# Patient Record
Sex: MALE | Race: WHITE | NOT HISPANIC OR LATINO | URBAN - METROPOLITAN AREA
[De-identification: names, ages, dates, MRNs, and addresses within clinical notes are randomized per-mention and may not be internally consistent; named-entity substitution may affect disease eponyms.]

---

## 2023-01-01 ENCOUNTER — TELEPHONE (OUTPATIENT)
Dept: ONCOLOGY | Facility: CLINIC | Age: 68
End: 2023-01-01
Payer: COMMERCIAL

## 2023-01-01 ENCOUNTER — TRANSCRIBE ORDERS (OUTPATIENT)
Dept: OTHER | Age: 68
End: 2023-01-01

## 2023-01-01 ENCOUNTER — TELEPHONE (OUTPATIENT)
Dept: TRANSPLANT | Facility: CLINIC | Age: 68
End: 2023-01-01
Payer: COMMERCIAL

## 2023-01-01 ENCOUNTER — ALLIED HEALTH/NURSE VISIT (OUTPATIENT)
Dept: TRANSPLANT | Facility: CLINIC | Age: 68
End: 2023-01-01
Payer: COMMERCIAL

## 2023-01-01 ENCOUNTER — OFFICE VISIT (OUTPATIENT)
Dept: ONCOLOGY | Facility: CLINIC | Age: 68
End: 2023-01-01
Attending: STUDENT IN AN ORGANIZED HEALTH CARE EDUCATION/TRAINING PROGRAM
Payer: COMMERCIAL

## 2023-01-01 ENCOUNTER — PATIENT OUTREACH (OUTPATIENT)
Dept: SURGERY | Facility: CLINIC | Age: 68
End: 2023-01-01
Payer: COMMERCIAL

## 2023-01-01 ENCOUNTER — PATIENT OUTREACH (OUTPATIENT)
Dept: CARE COORDINATION | Facility: CLINIC | Age: 68
End: 2023-01-01
Payer: COMMERCIAL

## 2023-01-01 ENCOUNTER — VIRTUAL VISIT (OUTPATIENT)
Dept: ONCOLOGY | Facility: CLINIC | Age: 68
End: 2023-01-01
Attending: NURSE PRACTITIONER
Payer: COMMERCIAL

## 2023-01-01 ENCOUNTER — PATIENT OUTREACH (OUTPATIENT)
Dept: ONCOLOGY | Facility: CLINIC | Age: 68
End: 2023-01-01
Payer: COMMERCIAL

## 2023-01-01 ENCOUNTER — PRE VISIT (OUTPATIENT)
Dept: OTHER | Age: 68
End: 2023-01-01

## 2023-01-01 VITALS
TEMPERATURE: 98.1 F | HEIGHT: 70 IN | SYSTOLIC BLOOD PRESSURE: 167 MMHG | WEIGHT: 203.5 LBS | RESPIRATION RATE: 12 BRPM | DIASTOLIC BLOOD PRESSURE: 83 MMHG | BODY MASS INDEX: 29.13 KG/M2 | OXYGEN SATURATION: 100 % | HEART RATE: 65 BPM

## 2023-01-01 VITALS
RESPIRATION RATE: 16 BRPM | TEMPERATURE: 98.1 F | DIASTOLIC BLOOD PRESSURE: 74 MMHG | WEIGHT: 183.3 LBS | BODY MASS INDEX: 26.27 KG/M2 | SYSTOLIC BLOOD PRESSURE: 120 MMHG | HEART RATE: 77 BPM | OXYGEN SATURATION: 99 %

## 2023-01-01 VITALS — HEIGHT: 71 IN | WEIGHT: 185 LBS | BODY MASS INDEX: 25.9 KG/M2

## 2023-01-01 VITALS — BODY MASS INDEX: 24.92 KG/M2 | HEIGHT: 71 IN | WEIGHT: 178 LBS

## 2023-01-01 DIAGNOSIS — C25.2: ICD-10-CM

## 2023-01-01 DIAGNOSIS — C25.2: Primary | ICD-10-CM

## 2023-01-01 DIAGNOSIS — Z00.6 RESEARCH STUDY PATIENT: Primary | ICD-10-CM

## 2023-01-01 DIAGNOSIS — C25.9 PANCREATIC CANCER (H): Primary | ICD-10-CM

## 2023-01-01 DIAGNOSIS — Z00.6 EXAMINATION OF PARTICIPANT OR CONTROL IN CLINICAL RESEARCH: ICD-10-CM

## 2023-01-01 DIAGNOSIS — C25.1 MALIGNANT NEOPLASM OF BODY OF PANCREAS (H): Primary | ICD-10-CM

## 2023-01-01 LAB
BMT WORKUP IRRADIATED BLOOD REQUIRED: NORMAL
SPECIMEN EXPIRATION DATE: NORMAL

## 2023-01-01 PROCEDURE — 99215 OFFICE O/P EST HI 40 MIN: CPT | Performed by: STUDENT IN AN ORGANIZED HEALTH CARE EDUCATION/TRAINING PROGRAM

## 2023-01-01 PROCEDURE — G0463 HOSPITAL OUTPT CLINIC VISIT: HCPCS | Performed by: STUDENT IN AN ORGANIZED HEALTH CARE EDUCATION/TRAINING PROGRAM

## 2023-01-01 PROCEDURE — 99417 PROLNG OP E/M EACH 15 MIN: CPT | Performed by: NURSE PRACTITIONER

## 2023-01-01 PROCEDURE — 99215 OFFICE O/P EST HI 40 MIN: CPT | Performed by: NURSE PRACTITIONER

## 2023-01-01 PROCEDURE — 99205 OFFICE O/P NEW HI 60 MIN: CPT | Performed by: STUDENT IN AN ORGANIZED HEALTH CARE EDUCATION/TRAINING PROGRAM

## 2023-01-01 RX ORDER — LOSARTAN POTASSIUM 25 MG/1
1 TABLET ORAL EVERY MORNING
COMMUNITY
Start: 2023-01-01 | End: 2023-01-01

## 2023-01-01 RX ORDER — ATORVASTATIN CALCIUM 40 MG/1
40 TABLET, FILM COATED ORAL
COMMUNITY
Start: 2022-10-10

## 2023-01-01 RX ORDER — LOPERAMIDE HCL 2 MG
CAPSULE ORAL
COMMUNITY
Start: 2023-01-01

## 2023-01-01 RX ORDER — ACETAMINOPHEN 500 MG
1000 TABLET ORAL
COMMUNITY
Start: 2023-01-01

## 2023-01-01 RX ORDER — ONDANSETRON 8 MG/1
8 TABLET, FILM COATED ORAL EVERY 8 HOURS PRN
COMMUNITY
Start: 2023-01-01

## 2023-01-01 RX ORDER — METOPROLOL SUCCINATE 25 MG/1
12.5 TABLET, EXTENDED RELEASE ORAL 2 TIMES DAILY
COMMUNITY

## 2023-01-01 RX ORDER — MAGNESIUM HYDROXIDE/ALUMINUM HYDROXICE/SIMETHICONE 120; 1200; 1200 MG/30ML; MG/30ML; MG/30ML
30 SUSPENSION ORAL
COMMUNITY
Start: 2023-01-01

## 2023-01-01 RX ORDER — LINEZOLID 600 MG/1
TABLET, FILM COATED ORAL
COMMUNITY
Start: 2023-01-01 | End: 2023-01-01

## 2023-01-01 RX ORDER — MORPHINE SULFATE 15 MG/1
TABLET ORAL
COMMUNITY
Start: 2023-01-01

## 2023-01-01 RX ORDER — LORAZEPAM 0.5 MG/1
0.5 TABLET ORAL EVERY 8 HOURS PRN
COMMUNITY
Start: 2023-01-01

## 2023-01-01 RX ORDER — AMLODIPINE BESYLATE 5 MG/1
1 TABLET ORAL EVERY MORNING
COMMUNITY
Start: 2023-01-01 | End: 2023-01-01

## 2023-01-01 RX ORDER — TAMSULOSIN HYDROCHLORIDE 0.4 MG/1
0.4 CAPSULE ORAL
COMMUNITY
Start: 2022-10-10

## 2023-01-01 RX ORDER — BLOOD-GLUCOSE METER
EACH MISCELLANEOUS
COMMUNITY
Start: 2023-01-01

## 2023-01-01 RX ORDER — OMEPRAZOLE 40 MG/1
40 CAPSULE, DELAYED RELEASE ORAL
COMMUNITY
Start: 2023-01-01

## 2023-01-01 RX ORDER — PROCHLORPERAZINE MALEATE 10 MG
10 TABLET ORAL EVERY 6 HOURS PRN
COMMUNITY
Start: 2023-01-01

## 2023-01-01 RX ORDER — PANCRELIPASE 60000; 12000; 38000 [USP'U]/1; [USP'U]/1; [USP'U]/1
CAPSULE, DELAYED RELEASE PELLETS ORAL
COMMUNITY
Start: 2023-01-01

## 2023-01-01 RX ORDER — AMOXICILLIN 250 MG
1 CAPSULE ORAL
COMMUNITY
Start: 2021-10-10

## 2023-01-01 RX ORDER — DEXAMETHASONE 2 MG/1
2 TABLET ORAL
COMMUNITY
Start: 2023-01-01 | End: 2024-05-07

## 2023-01-01 ASSESSMENT — PAIN SCALES - GENERAL
PAINLEVEL: SEVERE PAIN (6)
PAINLEVEL: MODERATE PAIN (4)
PAINLEVEL: NO PAIN (0)

## 2023-03-29 NOTE — TELEPHONE ENCOUNTER
Action Taken REQUEST SENT TO [facility] FOR ALL IMAGING DATING BACK TO 2018  REQUEST SENT TO [facility] FOR EUS ERCP AND PATH REPORTS.  CK

## 2023-03-29 NOTE — TELEPHONE ENCOUNTER
Action 03/29/2023 503PM   Action Taken IMAGING RESOLVED PENDING EUS AND ERCP AND PATH REPORTS.    CK

## 2023-03-30 NOTE — PROGRESS NOTES
New Patient Oncology Nurse Navigator Note     Referring provider: Dr. Engle     Referring Clinic/Organization: [facility]      Referred to: Surgical Oncology -  Hepatobiliary / GI Cancers     Requested provider (if applicable): First available provider    Referral Received: 03/30/23       Evaluation for : Pancreas cancer      Clinical History (per Nurse review of records provided):      See book marked documents:       Referring MD office note    Pathology report    Imaging reports    Clinical Assessment / Barriers to Care (Per Nurse):    None at this time.     Records Location:     [facility] Everywhere     Records Needed:     ABDOMINAL IMAGING (CT SCANS, MRI, US, PET SCANS)  DATING BACK TO 2018      Additional testing needed prior to consult:     NONE AT THIS TIME    Referral updates and Plan:       Consult with Surgical Oncology       Lillian Petty RN, BSN   Surgical Oncology New Patient Nurse Navigator  [facility]  [phone]

## 2023-03-31 NOTE — TELEPHONE ENCOUNTER
Imaging Received  2023 10:34 AM ABT   Action: Images from [facility] received and resolved to PACS.     Action 2023 9:28 AM ABT   Action Taken Called and spoke to Ramonita with [facility], she states that images were done with [facility]    Called Judith Rosenthal and unable to speak to team. LVM for amanada with urgent abd/pel image request     RECORDS STATUS - ALL OTHER DIAGNOSIS      RECORDS RECEIVED FROM: [facility]   DATE RECEIVED:    NOTES STATUS DETAILS   OFFICE NOTE from referring provider -[facility] 23: Dr Kavitha Engle   OFFICE NOTE from medical oncologist EILEEN-[facility] 23: Dr Kavitha Engle   [facility]   MEDICATION LIST [facility]    LABS     PATHOLOGY REPORTS Req -[facility]  [employer] 23: [id number]  23: [id number]   ANYTHING RELATED TO DIAGNOSIS [facility] Most recent 23   IMAGING (NEED IMAGES & REPORT)     CT SCANS PACS [facility]:   23--16: CT Abd Pel  23: CT Abd

## 2023-04-21 NOTE — NURSING NOTE
"Oncology Rooming Note    April 21, 2023 11:26 AM   Jaren Morales is a 67 year old male who presents for:    Chief Complaint   Patient presents with     Oncology Clinic Visit     Primary adenocarcinoma of tail of pancreas     Initial Vitals: BP (!) 167/83   Pulse 65   Temp 98.1  F (36.7  C) (Oral)   Resp 12   Ht 1.779 m (5' 10.04\")   Wt 92.3 kg (203 lb 8 oz)   SpO2 100%   BMI 29.17 kg/m   Estimated body mass index is 29.17 kg/m  as calculated from the following:    Height as of this encounter: 1.779 m (5' 10.04\").    Weight as of this encounter: 92.3 kg (203 lb 8 oz). Body surface area is 2.14 meters squared.  No Pain (0) Comment: Data Unavailable   No LMP for male patient.  Allergies reviewed: Yes  Medications reviewed: Yes    Medications: Medication refills not needed today.  Pharmacy name entered into [employer]: [facility] [address] [place] [address], [place] - [address] [address]    Clinical concerns: None      Shantel Garcia            "

## 2023-05-05 NOTE — TELEPHONE ENCOUNTER
TELEPHONE ENCOUNTER    Date: 5/5/2023    I spoke with Jaren's daughter Fabiola on behalf of Jaren about the TC-510 clinical trial. I provided an explanation of the trial including details about determining mesothelin expression, leukapheresis, chemotherapy, and the  product and infusion. I discussed some risks associated with getting the product like cytokine release syndrome.    She verbalized that he would have an interest in coming in to discuss the trial with a provider and potentially consent to start the process to determine if he has mesothelin expression. I stated I will send the consent documents and schedule him for a visit to go over the pre-screening consent with a provider. They are agreeable to this.    All questions were answered and the patient knows to call with any new or worsening concerns.    Mari Preciado, Clinical Research Coordinator, RN.  [facility] [place]  Clinical Trials Office  Solid Tumor Unit

## 2023-05-12 NOTE — PROGRESS NOTES
[id number]: Informed Consent Note     The consent form, including purpose, risks and benefits, was reviewed with Jaren Morales, and all questions were answered before he signed the consent form. The patient understands that the study involves a screening phase for MSLN using recent biopsy slides then a screening for leukapheresis and leukapheresis itself. Another screening for treatment, an active treatment phase as well as a post-treatment follow up phase.     Present during the discussion was his wife and daughter Pancho Morales. A copy of the signed form was provided to the patient. No procedures specific to this study were performed prior to the patient signing the consent form. He also signed the form for consent to e-mail communication with his daughters e-mail address.     Consent Version Date: 4/18/22, approval date 1/23/23  Consent obtained by: Dr. Chauhan  Date: 5/12/23  HIPAA authorization signed?: yes  HIPAA authorization version date: 10 NOV 22  Howie is aware we will contact him once we have MSLN results.  Howie and his family are aware that he needs to stay local during the treatment phase through day 28 and agree to this. They are hoping to stay in [place] and will likely need lodging during the chemotherapy phase also. His wife and daughter along with Howie are also aware of the need of 24/7 care provider during the treatment phase through day 28 along with the restriction of no driving. They stated they agreed to this and understand it could be different care givers and stated this would not be an issue.     Shayna Pak RN    Form 503.03.01 (Version 2)     Effective date: 01AUG2018     Next Review Date: 01AUG2020

## 2023-05-12 NOTE — LETTER
2023         RE: Jaren Morales  [address]  [place] [address]        Dear Colleague,    Thank you for referring your patient, Jaren Morales, to the [facility]. Please see a copy of my visit note below.    [facility] - Medical Oncology Follow-Up Consult Note  2023    Patient Identifiers     Name: Jaren Morales  Preferred Address: J[place]  Preferred Language: English  : 1955  Gender: male    Assessment and Plan     Mr. Jaren Morales is a 67 year old male smoker with a history of HTN and metastatic pancreas CA on first-line FOLFIRINOX who returns to clinic for TCR2 clinical trial consent.    Today we discussed consent for prescreening Tyson for TCR2 clinical trial.  This is a trial of engineered T-cell receptor targeting MSLN.  Key inclusion criteria are expression of the MSLN receptor on greater than 50% of all tumor cells, and progression on first-line chemotherapy for pancreas cancer.  If MSLN testing is performed on archival tissue, expression will need to be confirmed with fresh biopsy during trial enrollment.  Following prescreening and legibility, patient will require leukapheresis without stem cell mobilization.  Leukapheresis may require the placement of an additional central venous catheter if patient does not have adequate venous access.  Side effects of prescreening leukapheresis include potential pain, bleeding, and infection associated with biopsy or line placement.  These are rare side effects which we generally do not expect to occur.  Patient understands these risks and benefits and agrees to prescreening and leukapheresis consent    Plan:  Pancreas CA  - SEND MSLN testing on archival tissue  - cont first-line dose-reduced FOLFIRINOX chemotherapy  - monitor for treatment related AE's  - patient to be contacted by trial staff regarding MSLN testing outcome    The patient and family asked numerous excellent questions which I  answered to the best of my abilities. At the completion of our consultation, the patient and accompanying caregivers had a strong understanding of the plan. They have our contact information for any further questions or concerns, and know to reach out for any urgent matters. Patient and family aware that they should call 911 for emergencies.       45 minutes spent on the date of the encounter doing chart review, review of test results, interpretation of tests, patient visit and documentation       Go Chauhan MD, PhD   of Medicine  Division of Hematology, Oncology and Transplantation  [facility]    -----------------------------------    Oncology Summary     Cancer Staging   No matching staging information was found for the patient.    Oncology History    No history exists.       Subjective/Interval Events     - discussed TCR2 clinical trial enrollment criteria and potential adverse events    Review of Systems: 14 point ROS negative other than the symptoms noted above in the HPI.    Physical Exam     Vital signs: /74   Pulse 77   Temp 98.1  F (36.7  C) (Oral)   Resp 16   Wt 83.1 kg (183 lb 4.8 oz)   SpO2 99%   BMI 26.27 kg/m      ECOG performance status:  0  Vascular access:  R chest port    GENERAL: Well-nourished healthy-appearing man, sitting in chair, no acute distress.   HEENT: No icterus, no pallor. Moist mucous membranes.   LUNGS: Clear to ausculation bilaterally, normal work of breathing.   CARDIOVASCULAR: Regular rate and rhythm, no murmurs, gallops or rubs.   ABDOMEN: Soft, nontender and nondistended.  EXTREMITIES: No cyanosis, no clubbing, no edema.   NEUROLOGIC: No focal deficits, alert/ oriented  LYMPH NODE EXAM: No palpable adenopathy.    Objective Data     Lab data:  I have personally reviewed the lab data, notable for:    - reviewed in chart    Radiology data:  I have personally reviewed the radiology data, notable for:  - no new data    Pathology and  other data:  I have personally reviewed and interpreted the pathology data, notable for:    - no new data        Go Chauhan MD

## 2023-05-12 NOTE — NURSING NOTE
"Oncology Rooming Note    May 12, 2023 2:24 PM   Jaren Morales is a 67 year old male who presents for:    Chief Complaint   Patient presents with     Oncology Clinic Visit     RTN for Primary Adenocarcinoma of Pancrease      Initial Vitals: Blood Pressure 120/74   Pulse 77   Temperature 98.1  F (36.7  C) (Oral)   Respiration 16   Weight 83.1 kg (183 lb 4.8 oz)   Oxygen Saturation 99%   Body Mass Index 26.27 kg/m   Estimated body mass index is 26.27 kg/m  as calculated from the following:    Height as of 4/21/23: 1.779 m (5' 10.04\").    Weight as of this encounter: 83.1 kg (183 lb 4.8 oz). Body surface area is 2.03 meters squared.  Moderate Pain (4) Comment: Data Unavailable   No LMP for male patient.  Allergies reviewed: Yes  Medications reviewed: Yes    Medications: Medication refills not needed today.  Pharmacy name entered into [email or web address]: [facility] [address] [place], [place] [address], [place] - [address]    Clinical concerns: none       Fouzia Mckeon MA            "

## 2023-05-12 NOTE — PROGRESS NOTES
[facility] - Medical Oncology Follow-Up Consult Note  2023    Patient Identifiers     Name: Jaren Morales  Preferred Address: [place]  Preferred Language: English  : 1955  Gender: male    Assessment and Plan     Mr. Jaren Morales is a 67 year old male smoker with a history of HTN and metastatic pancreas CA on first-line FOLFIRINOX who returns to clinic for TCR2 clinical trial consent.    Today we discussed consent for prescreening Tyson for TCR2 clinical trial.  This is a trial of engineered T-cell receptor targeting MSLN.  Key inclusion criteria are expression of the MSLN receptor on greater than 50% of all tumor cells, and progression on first-line chemotherapy for pancreas cancer.  If MSLN testing is performed on archival tissue, expression will need to be confirmed with fresh biopsy during trial enrollment.  Following prescreening and legibility, patient will require leukapheresis without stem cell mobilization.  Leukapheresis may require the placement of an additional central venous catheter if patient does not have adequate venous access.  Side effects of prescreening leukapheresis include potential pain, bleeding, and infection associated with biopsy or line placement.  These are rare side effects which we generally do not expect to occur.  Patient understands these risks and benefits and agrees to prescreening and leukapheresis consent    Plan:  Pancreas CA  - SEND MSLN testing on archival tissue  - cont first-line dose-reduced FOLFIRINOX chemotherapy  - monitor for treatment related AE's  - patient to be contacted by trial staff regarding MSLN testing outcome    The patient and family asked numerous excellent questions which I answered to the best of my abilities. At the completion of our consultation, the patient and accompanying caregivers had a strong understanding of the plan. They have our contact information for any further questions or concerns, and know to reach out for  any urgent matters. Patient and family aware that they should call 911 for emergencies.       45 minutes spent on the date of the encounter doing chart review, review of test results, interpretation of tests, patient visit and documentation       Go Chauhan MD, PhD   of Medicine  Division of Hematology, Oncology and Transplantation  [facility]    -----------------------------------    Oncology Summary     Cancer Staging   No matching staging information was found for the patient.    Oncology History    No history exists.       Subjective/Interval Events     - discussed TCR2 clinical trial enrollment criteria and potential adverse events    Review of Systems: 14 point ROS negative other than the symptoms noted above in the HPI.    Physical Exam     Vital signs: /74   Pulse 77   Temp 98.1  F (36.7  C) (Oral)   Resp 16   Wt 83.1 kg (183 lb 4.8 oz)   SpO2 99%   BMI 26.27 kg/m      ECOG performance status:  0  Vascular access:  R chest port    GENERAL: Well-nourished healthy-appearing man, sitting in chair, no acute distress.   HEENT: No icterus, no pallor. Moist mucous membranes.   LUNGS: Clear to ausculation bilaterally, normal work of breathing.   CARDIOVASCULAR: Regular rate and rhythm, no murmurs, gallops or rubs.   ABDOMEN: Soft, nontender and nondistended.  EXTREMITIES: No cyanosis, no clubbing, no edema.   NEUROLOGIC: No focal deficits, alert/ oriented  LYMPH NODE EXAM: No palpable adenopathy.    Objective Data     Lab data:  I have personally reviewed the lab data, notable for:    - reviewed in chart    Radiology data:  I have personally reviewed the radiology data, notable for:  - no new data    Pathology and other data:  I have personally reviewed and interpreted the pathology data, notable for:    - no new data

## 2023-06-02 NOTE — PROGRESS NOTES
Social Work - Telephone/ITC Global[employer] message  [facility]  Data:   Patient Name: Jaren Morales    /Age: 1955 (67 year old)      Referral Source: Mari Preciado RN   Reason for Referral: Hope Gbariel    Intervention: Left voice message for patient on 2023  Plan:  will await patient's return phone call/message and provide assistance at that time.     ABHISHEK Bowman,[facility]  Hematology/Oncology Social Worker  Phone:[phone] Pager: [phone]

## 2023-06-07 NOTE — TELEPHONE ENCOUNTER
[facility]: Cancer Care                                                                                          Per Dr. Chauhan: have pt check CBC and CMP in one week, follow-up with him or Tori in [facility] clinic. Writer called [facility] and spoke with Dr. Kadie Knight' nurse. She stated pt missed 5/30 infusion appointment which needs to be rescheduled so will reach out and schedule him for labs and a visit next week. Will draw CBC and CMP. Writer will follow-up for results. Depending on schedule, will call pt to offer either visit with Dr. Chauhan 6/23 or sooner with Tori.    Signature:  Navin Rodriges RN

## 2023-06-13 NOTE — TELEPHONE ENCOUNTER
[facility]: Cancer Care                                                                                          Per Tori Pepe: pt to have labs CA 19-9, CMP and CBC prior to her virtual visit 6/14. Writer had also received a vm from URI Knight regarding pt's follow-up visit with Dr. Engle and what orders DTC would like done.    Called clinic back and per URI Beal, pt may come in any time for the above labs under Dr. Engle' name. Labs were ordered to be done prior to his visit that morning but ok to come in sooner, she will inform .     Writer called pt and reached daughter Pancho who verbalized understanding and will take pt to get labs today.    Signature:  Navin Rodriges RN

## 2023-06-13 NOTE — PROGRESS NOTES
Virtual Visit Details     Type of service: Video Visit     Originating Location (pt. Location): Home  Distant Location (provider location): Off-site  Platform used for Video Visit: Oscar  Visit Start Time: 9:16  Visit End Time: 9:50       Navigating Cancer [facility]      PATIENT NAME: Jaren Morales   MRN # [id number]  DATE OF VISIT: Jun 14, 2023   YOB: 1955    Study: A PHASE 1/2 SINGLE ARM OPEN-LABEL CLINICAL TRIAL OF TC-510 IN PATIENTS WITH ADVANCED MESOTHELIN-EXPRESSING CANCER     Reason for Visit: Jaren Morales is a 67 year old male with a history of Metastatic Pancreatic Cancer who is being evaluated by video visit to evaluate clinical condition with regards to ability to move forward with leukophoresis portion of TCR2 clinical trial. Plan was to start leukophoresis 6/8. However, this was postponed due to hospitalization for C. Diff, hypotension, and sepsis.     Oncology History: See Dr. Chauhan's note from 5/12/23 for more details regarding oncology history.     03/08/23-03/18/23- Presented for progressive abdominal discomfort, nausea and vomiting. Was noted to have left-sided hydronephrosis and obstructive uropathy likely related to a pancreatic tail mass.  Admitted for Staphylococcus epidermidis bacteremia and left UPJ obstruction likely 2/2 malignancy. He underwent bilateral ureteral stent placement on 3/8/2023. He was treated with 2 weeks of IV Vanco and subsequently 4 weeks of Zyvox.   03/20/23- Biopsy of Pancreatic Mass- Adenocarcinoma, morphologic and immunohistochemical profile supportive of pancreatic adenocarcinoma although diagnostic cellular material is minimal.   03/29/23- Colonoscopy showed mass in the rectosigmoid colon, remainder of the colon was not evaluated. Pathology: Adenocarcinoma, metastatic consistent with pancreatic primary  04/03/23- Started palliative chemotherapy with FOLFIRINOX, cycle 3 5/18 04/14/23- 04/19/23- Admitted for Chemotherapy-induced nausea,  vomiting and diarrhea. Acute severe pancytopenia secondary to chemotherapy.   05/12/23- Date of Consent for prescreening process for TCR2 clinical trial. MSLN testing on archival tissue. Tissue + for MSLN.   05/22/23- 5/25/23- Admitted for UTI, treated with IV antibiotics and discharged on a 7 day course of Cefdinir. Ureteral stent exchange 5/23/23. C. Diff + and started on oral Vancomycin.   06/01/23- 06/07/23- Admitted for acute C. difficile colitis, abdominal pain, ileus. Treated with oral and rectal Vancomycin. Discharged on an 8 day course of Vancomycin     06/11/23-06/12/23- Admitted to observation for nausea and vomiting.  Treated with oral and rectal Vancomycin.    Interval History:    I met with Howie and his daughter virtually today to discussed TCR2 clinical trial.     He reports a rough night due to pain. Notes ongoing abdominal pain and pain to back.  Unclear if pain is worse since discharge from hospital.  States when in the hospital he was more on top of his pain regimen.  Was able to sleep after taking medication for pain.  Notes ongoing abdominal bloating.  Unsure if this is worsening since discharge from the hospital.  Did have some nausea this morning.  No vomiting.  He is passing gas and stool.  States yesterday he had 10-15 very small volume stools.  Stool more firm today. Notes was having 4-5 stools/day in the hospital. However, these were larger volume so unclear if overall volume of his stools is changed. Denies overt fevers.  Felt slightly chilled this morning but per daughter home was cold and family also chilled.  Feeling better at the time of video call.  He continues on oral vancomycin.  He was prescribed an additional 7 days upon discharge from the hospital.  His daughter is concerned that his infection is not resolving.  States he typically improves with vancomycin enemas and then worsens after discharge from the hospital.  She wonders if an infectious disease consult would be  reasonable. Appetite is improving. Has been able to eat more.     We reviewed his labs from yesterday. These showed mild leukocytosis. Discussed this is concerning in the setting of active c. Diff infection with increased stools. Although unclear if actual volume changes. Difficult to assess over virtual visit. I recommend they monitor him closely today. Recommend he be seen for in-person evaluation with fever, uncontrolled nausea, vomiting, worsening abdominal pain, dizziness, increased stool out-put, or any new or concerning symptoms. He otherwise denies new signs of infection. Denies new urinary complaints.     Discussed performance status.  He states he is out of bed or the chair most of the day.  Has been able to do some gardening.  Also mowed the lawn. Denies being in bed or chair > 50 % of the day.     Reviewed his CT from recent admission.  This did show some interval decrease in his pancreatic mass as well as ureteral mass.  Given toxicities from FOLFIRINOX, his primary oncologist plans to transition to gemcitabine/abraxane.  They will follow-up with primary oncologist tomorrow.  They are unsure if this will include chemotherapy treatment.    We reviewed the trial at length, including leukapheresis, T-cell engineering, lymphodepleting chemotherapy, hospitalization, and frequent visits.  The patient does continue to be motivated to move forward with the trial. Labs drawn 6/13 show hemoglobin of 8.9.  This would need to be 9 to move forward with leukapheresis.  In addition I discussed I am concerned about ongoing C. difficile infection.  While this is not exclusionary for leukapheresis portion of the trial.  I would be concerned about the safety of the trial with ongoing infection during treatment phase.  I discussed that I will review his case with the PI, Dr. Chauhan and call them back with any additional recommendations.     Addendum 1120: I discussed with Dr. Chauhan who recommended the patient received 1  month of gemcitabine/abraxane to ensure stability. If stable at this time and meets eligibility criteria, could move forward with wash out for leukophoresis at this point. Plan for him to follow-up with Dr. Chauhan or myself in 4 weeks to assess stability. This was discussed with the patient's daughter who in agreement with this plan. I also discussed this plan with his primary oncology team.     History:  PMH:  Stage IV Pancreatic cancer with mets to the colon- on FOLFIRINOX starting 4/20/2023. Most recent dose given on 5/17/2023.  CAD- s/p CABG in 2015-on baby aspirin daily and atorvastatin nightly  HTN- on losartan and metoprolol  Depression and anxiety- takes sertraline   Complicated UTI with coagulase-negative staph septicemia 3/2023- treated with 6 weeks of antimicrobial therapy.   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 A right ureteral stent has been removed and he has had no recurrent right ureteral obstruction.    C. Diff Colitis- 5/25/23, started on oral vancomycin. Admitted 6/1-6/7 due to C. Diff colitis and ileus   Chemotherapy Induced Nausea and Vomiting   Social History     Socioeconomic History     Marital status:      Spouse name: Not on file     Number of children: Not on file     Years of education: Not on file     Highest education level: Not on file   Occupational History     Not on file   Tobacco Use     Smoking status: Every Day     Types: Cigarettes     Smokeless tobacco: Not on file   Vaping Use     Vaping status: Not on file   Substance and Sexual Activity     Alcohol use: Never     Drug use: Never     Sexual activity: Not on file   Other Topics Concern     Not on file   Social History Narrative     Not on file     Social Determinants of Health     Financial Resource Strain: Not on file   Food Insecurity: Not on file   Transportation Needs: Not on file   Physical Activity: Not on file   Stress: Not on file   Social Connections:  Not on file   Intimate Partner Violence: Not on file   Housing Stability: Not on file     Allergies:  No Known Allergies   Current Medications:   Current Outpatient Medications   Medication Sig Dispense Refill     acetaminophen (TYLENOL) 500 MG tablet Take 1,000 mg by mouth       alum & mag hydroxide-simethicone (MAALOX) 200-200-20 MG/5ML SUSP suspension Take 30 mLs by mouth       atorvastatin (LIPITOR) 40 MG tablet Take 40 mg by mouth       Contour Next EZ (CONTOUR NEXT EZ W/DEVICE KIT) w/Device KIT USE TO CHECK GLUCOSE IN THE MORNING       CONTOUR NEXT TEST test strip 1 strip daily       CREON 67419-69487 units CPEP per EC capsule TAKE 4 CAPSULES BY MOUTH THREE TIMES DAILY WITH MEALS       dexamethasone (DECADRON) 2 MG tablet Take 2 mg by mouth       diphenhydrAMINE-acetaminophen (TYLENOL PM)  MG tablet Take 2 tablets by mouth       linezolid (ZYVOX) 600 MG tablet TAKE 1 TABLET BY MOUTH EVERY 12 HOURS FOR 28 DAYS       loperamide (IMODIUM) 2 MG capsule TAKE 2 CAPSULES WITH FIRST DIARRHEA, THEN 1 CAPSULE WITH EACH ADDITIONAL DIARRHEA, MAX 8 CAPS PER DAY       LORazepam (ATIVAN) 0.5 MG tablet Take 0.5 mg by mouth every 8 hours as needed       metoprolol succinate ER (TOPROL XL) 25 MG 24 hr tablet Take 12.5 mg by mouth 2 times daily       morphine (MSIR) 15 MG IR tablet TAKE 1 TO 2 TABLETS BY MOUTH EVERY 4 HOURS AS NEEDED FOR MODERATE PAIN       omeprazole (PRILOSEC) 40 MG DR capsule Take 40 mg by mouth daily before breakfast       ondansetron (ZOFRAN) 8 MG tablet Take 8 mg by mouth every 8 hours as needed       prochlorperazine (COMPAZINE) 10 MG tablet Take 10 mg by mouth every 6 hours as needed       senna-docusate (SENOKOT-S/PERICOLACE) 8.6-50 MG tablet Take 1 tablet by mouth       sertraline (ZOLOFT) 50 MG tablet Take 50 mg by mouth       tamsulosin (FLOMAX) 0.4 MG capsule Take 0.4 mg by mouth        Physical Exam:   General: patient appears well in no acute distress, alert and oriented, speech clear and  fluid  Skin: no visualized rash or lesions on visualized skin  Resp: Appears to be breathing comfortably without accessory muscle usage, speaking in full sentences, no audible wheezes or cough.  Psych: Coherent speech, normal rate and volume, able to articulate logical thoughts, able to abstract reason, no tangential thoughts, no hallucinations or delusions  Patient's affect is appropriate.    Laboratory and Imaging Studies:   Labs drawn at [facility] 6/13 reviewed and notable for: WBC 11.6, Hgb 8.9, ANC 9.8, , K 4.1, albumin 2.9, protein 5.9. CA 19-9 is pending at this time.     CT A/P (6/11/23):    1. With comparison to previous exam there has been interval decrease in the   enhancement of the mucosal walls of the sigmoid colon however there appears to   be slight interval increase in the edema within those walls.  This causes some   narrowing of the lumen with apparent retained liquid and stool proximal to the   edematous region.  This is best appreciated on series 2/#52.  Findings are   consistent with C. Difficile colitis.  There is interval development of small   bilateral paracolic gutter edema.  No obstruction to the small bowel although   there are multiple fluid-filled loops.   2.  Slight interval decrease in size of the tail of pancreas mass.   3.  Focal lesion along the mid left ureter measure slightly differently today   but is essentially morphologically unchanged.  Differences are likely due to   variability in measurement technique.   4.  No free air.     I have reviewed the CT report from [facility].      Assessment/Plan:   ##Metastatic Pancreatic Cancer:   History as above. Receiving palliative FOLFIRINOX, with cycle 3 5/18 under the care of Dr. Engle at [facility]. Plan to switch to Gemcitabine/Abraxane given toxicities from prior therapy. CA 19-9 checked yesterday and is pending.  CT 6/12 shows interval decrease in size of pancreatic mass. He was referred to the [facility] to be evaluated  for clinical trial. Patient opted to move forward with pre-screening for TCR2 clinical trial, including MSLN testing on archival tissue. This resulted + for MSLN. Plan was to undergo leukophoresis on 6/8. However, this was canceled due to admission for C. Diff infection. I am seeing him today to assess clinical condition and to determine if he can safely move forward with leukophoresis at this time.     We reviewed the TCR2 trial again today. We discussed this is a phase I trial to establish the safety, tolerability and recommended phase II dose of TC-510.  CAR-T cells are engineered T-cells that allow the T-cells to identify a specific antigen on the cancer cell and therefore attack it. His T-cells would be collected through a process called leukapheresis.  The T-cells are then engineered to express an antibody against mesothelin which is expressed on the surface of cancer cells.      His T-cells would be collected, after which it is anticipated it will take 6-8 weeks to  the CAR T-cells and during this time, he should continue routinely prescribed cancer therapy.  When the CAR T-cells are ready, he would be given given lymphodepleting chemotherapy for 4 consecutive days (days -7 thru -4).  Patients then receive TC-510 infusion on day 0.  Patients are hospitalized overnight after TC-510 infusion and must remain within 30 miles of the medical center at which treatment was received for approximately 8-12 weeks.  Patients must have a dedicated caregiver during this time and patients will be instructed not to drive.      TC-510 is a one-time only treatment.  Risks of treatment include, but are not limited to cytokine release syndrome - fever, chills, headaches, fatigue, arthralgia, nausea, vomiting, hypotension, rash, shortness of breath, encephalopathy.  Frequent follow up visits and labs are performed in the days and weeks following treatment.  He expressed understanding of this.     We reviewed the  eligibility criteria for leukophoresis, including: ECOG of 0 or 1, PLT > or = 100, hgb > or = 9. Must not recieved cytotoxic chemotherapy within 3 weeks of leukapheresis and therapeutic doses of steroids must be stopped > 72 hours prior to leukapheresis. Currently his hgb is 8.9. Discussed would need to be 9.0 to move forward. Infection is not technically exclusionary. However, would want to make sure he has recovered prior to moving forward. I discussed that I will review his case with the PI, Dr. Chauhan, and call them back with any additional recommendations.     Addendum 1120: I discussed the patient with Dr. Chauhan who recommended the he received 1 month of gemcitabine/abraxane to ensure stability- improved infection and tolerating therapy without significant acute toxicities. If stable at this time and otherwise meets eligibility criteria, could move forward with wash out for leukophoresis at this point. Plan for him to follow-up with Dr. Chauhan or myself in 4 weeks to assess stability. This was discussed with the patient's daughter who in agreement with this plan. These recommendations were also communicated with Dr. Engle's team.     ##Leukocytosis:   ##C. Diff Colitis:   C. Diff diagnosis 5/25 with admission 6/01/23- 06/07/23 for acute C. difficile colitis, abdominal pain, ileus. Treated with oral and rectal vancomycin. Again admitted from 06/11/23-06/12/22 for nausea and vomiting. Again treated with oral and rectal vancomycin. Today he notes ongoing abdominal pain and bloating.  Did have some nausea this morning.  No vomiting.  He is passing gas and stool.  States yesterday he had 10-15 very small volume stools.  Stool more firm today. States was having 4-5 stool/day in the hospital. However, these were larger volume so unclear if overall volume of his stools is changed. Denies overt fevers. Feeling better at the time of video call.  He continues on oral vancomycin.  He was prescribed an additional 7  days upon discharge from the hospital. Eating ok. Trying to push himself to drink more. Denies dizziness. Labs from 6/13 show slightly leukocytosis of 11.6.  Discussed this is concerning in the setting of active c. Diff infection with increased stools. Although unclear if actual volume changes. Difficult to assess over virtual visit. I recommend they monitor him closely today. Recommend evaluation in the ED with fever, uncontrolled nausea, vomiting, worsening abdominal pain, dizziness, increased stool out-put, or any new or concerning symptoms. He otherwise denies new signs of infection. He does have follow-up for evaluation in oncology clinic tomorrow. Daughter plans to pursue ID consult.       ECOG PS: 1    80 minutes spent by me on the date of the encounter doing chart review, review of outside records, review of test results, interpretation of tests, patient visit, documentation and discussion with study team.      ALESIA Mckeon, CNP  [facility], [facility]   [facility]  Clinical Trials Office

## 2023-06-14 NOTE — LETTER
6/14/2023         RE: Jaren Morales  [address] [place] [address]        Dear Colleague,    Thank you for referring your patient, Jaren Morales, to the [facility]. Please see a copy of my visit note below.    Virtual Visit Details     Type of service: Video Visit     Originating Location (pt. Location): Home  Distant Location (provider location): Off-site  Platform used for Video Visit: AmWell  Visit Start Time: 9:16  Visit End Time: 9:50       DEVELOPMENTAL [employer] [facility]      PATIENT NAME: Jaren Morales   MRN # [id number]  DATE OF VISIT: Jun 14, 2023   YOB: 1955    Study: A PHASE 1/2 SINGLE ARM OPEN-LABEL CLINICAL TRIAL OF TC-510 IN PATIENTS WITH ADVANCED MESOTHELIN-EXPRESSING CANCER     Reason for Visit: Jaren Morales is a 67 year old male with a history of Metastatic Pancreatic Cancer who is being evaluated by video visit to evaluate clinical condition with regards to ability to move forward with leukophoresis portion of TCR2 clinical trial. Plan was to start leukophoresis 6/8. However, this was postponed due to hospitalization for C. Diff, hypotension, and sepsis.     Oncology History: See Dr. Chauhan's note from 5/12/23 for more details regarding oncology history.     03/08/23-03/18/23- Presented for progressive abdominal discomfort, nausea and vomiting. Was noted to have left-sided hydronephrosis and obstructive uropathy likely related to a pancreatic tail mass.  Admitted for Staphylococcus epidermidis bacteremia and left UPJ obstruction likely 2/2 malignancy. He underwent bilateral ureteral stent placement on 3/8/2023. He was treated with 2 weeks of IV Vanco and subsequently 4 weeks of Zyvox.   03/20/23- Biopsy of Pancreatic Mass- Adenocarcinoma, morphologic and immunohistochemical profile supportive of pancreatic adenocarcinoma although diagnostic cellular material is minimal.   03/29/23- Colonoscopy showed mass in the rectosigmoid colon,  remainder of the colon was not evaluated. Pathology: Adenocarcinoma, metastatic consistent with pancreatic primary  04/03/23- Started palliative chemotherapy with FOLFIRINOX, cycle 3 5/18 04/14/23- 04/19/23- Admitted for Chemotherapy-induced nausea, vomiting and diarrhea. Acute severe pancytopenia secondary to chemotherapy.   05/12/23- Date of Consent for prescreening process for TCR2 clinical trial. MSLN testing on archival tissue. Tissue + for MSLN.   05/22/23- 5/25/23- Admitted for UTI, treated with IV antibiotics and discharged on a 7 day course of Cefdinir. Ureteral stent exchange 5/23/23. C. Diff + and started on oral Vancomycin.   06/01/23- 06/07/23- Admitted for acute C. difficile colitis, abdominal pain, ileus. Treated with oral and rectal Vancomycin. Discharged on an 8 day course of Vancomycin     06/11/23-06/12/23- Admitted to observation for nausea and vomiting.  Treated with oral and rectal Vancomycin.    Interval History:    I met with Howie and his daughter virtually today to discussed TCR2 clinical trial.     He reports a rough night due to pain. Notes ongoing abdominal pain and pain to back.  Unclear if pain is worse since discharge from hospital.  States when in the hospital he was more on top of his pain regimen.  Was able to sleep after taking medication for pain.  Notes ongoing abdominal bloating.  Unsure if this is worsening since discharge from the hospital.  Did have some nausea this morning.  No vomiting.  He is passing gas and stool.  States yesterday he had 10-15 very small volume stools.  Stool more firm today. Notes was having 4-5 stools/day in the hospital. However, these were larger volume so unclear if overall volume of his stools is changed. Denies overt fevers.  Felt slightly chilled this morning but per daughter home was cold and family also chilled.  Feeling better at the time of video call.  He continues on oral vancomycin.  He was prescribed an additional 7 days upon discharge  from the hospital.  His daughter is concerned that his infection is not resolving.  States he typically improves with vancomycin enemas and then worsens after discharge from the hospital.  She wonders if an infectious disease consult would be reasonable. Appetite is improving. Has been able to eat more.     We reviewed his labs from yesterday. These showed mild leukocytosis. Discussed this is concerning in the setting of active c. Diff infection with increased stools. Although unclear if actual volume changes. Difficult to assess over virtual visit. I recommend they monitor him closely today. Recommend he be seen for in-person evaluation with fever, uncontrolled nausea, vomiting, worsening abdominal pain, dizziness, increased stool out-put, or any new or concerning symptoms. He otherwise denies new signs of infection. Denies new urinary complaints.     Discussed performance status.  He states he is out of bed or the chair most of the day.  Has been able to do some gardening.  Also mowed the lawn. Denies being in bed or chair > 50 % of the day.     Reviewed his CT from recent admission.  This did show some interval decrease in his pancreatic mass as well as ureteral mass.  Given toxicities from FOLFIRINOX, his primary oncologist plans to transition to gemcitabine/abraxane.  They will follow-up with primary oncologist tomorrow.  They are unsure if this will include chemotherapy treatment.    We reviewed the trial at length, including leukapheresis, T-cell engineering, lymphodepleting chemotherapy, hospitalization, and frequent visits.  The patient does continue to be motivated to move forward with the trial. Labs drawn 6/13 show hemoglobin of 8.9.  This would need to be 9 to move forward with leukapheresis.  In addition I discussed I am concerned about ongoing C. difficile infection.  While this is not exclusionary for leukapheresis portion of the trial.  I would be concerned about the safety of the trial with ongoing  infection during treatment phase.  I discussed that I will review his case with the PI, Dr. Chauhan and call them back with any additional recommendations.     Addendum 1120: I discussed with Dr. Chauhan who recommended the patient received 1 month of gemcitabine/abraxane to ensure stability. If stable at this time and meets eligibility criteria, could move forward with wash out for leukophoresis at this point. Plan for him to follow-up with Dr. Chauhan or myself in 4 weeks to assess stability. This was discussed with the patient's daughter who in agreement with this plan. I also discussed this plan with his primary oncology team.     History:  PMH:  Stage IV Pancreatic cancer with mets to the colon- on FOLFIRINOX starting 4/20/2023. Most recent dose given on 5/17/2023.  CAD- s/p CABG in 2015-on baby aspirin daily and atorvastatin nightly  HTN- on losartan and metoprolol  Depression and anxiety- takes sertraline   Complicated UTI with coagulase-negative staph septicemia 3/2023- treated with 6 weeks of antimicrobial therapy.   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 A right ureteral stent has been removed and he has had no recurrent right ureteral obstruction.    C. Diff Colitis- 5/25/23, started on oral vancomycin. Admitted 6/1-6/7 due to C. Diff colitis and ileus   Chemotherapy Induced Nausea and Vomiting   Social History     Socioeconomic History    Marital status:      Spouse name: Not on file    Number of children: Not on file    Years of education: Not on file    Highest education level: Not on file   Occupational History    Not on file   Tobacco Use    Smoking status: Every Day     Types: Cigarettes    Smokeless tobacco: Not on file   Vaping Use    Vaping status: Not on file   Substance and Sexual Activity    Alcohol use: Never    Drug use: Never    Sexual activity: Not on file   Other Topics Concern    Not on file   Social History Narrative     Not on file     Social Determinants of Health     Financial Resource Strain: Not on file   Food Insecurity: Not on file   Transportation Needs: Not on file   Physical Activity: Not on file   Stress: Not on file   Social Connections: Not on file   Intimate Partner Violence: Not on file   Housing Stability: Not on file     Allergies:  No Known Allergies   Current Medications:   Current Outpatient Medications   Medication Sig Dispense Refill    acetaminophen (TYLENOL) 500 MG tablet Take 1,000 mg by mouth      alum & mag hydroxide-simethicone (MAALOX) 200-200-20 MG/5ML SUSP suspension Take 30 mLs by mouth      atorvastatin (LIPITOR) 40 MG tablet Take 40 mg by mouth      Contour Next EZ (CONTOUR NEXT EZ W/DEVICE KIT) w/Device KIT USE TO CHECK GLUCOSE IN THE MORNING      CONTOUR NEXT TEST test strip 1 strip daily      CREON 00567-85593 units CPEP per EC capsule TAKE 4 CAPSULES BY MOUTH THREE TIMES DAILY WITH MEALS      dexamethasone (DECADRON) 2 MG tablet Take 2 mg by mouth      diphenhydrAMINE-acetaminophen (TYLENOL PM)  MG tablet Take 2 tablets by mouth      linezolid (ZYVOX) 600 MG tablet TAKE 1 TABLET BY MOUTH EVERY 12 HOURS FOR 28 DAYS      loperamide (IMODIUM) 2 MG capsule TAKE 2 CAPSULES WITH FIRST DIARRHEA, THEN 1 CAPSULE WITH EACH ADDITIONAL DIARRHEA, MAX 8 CAPS PER DAY      LORazepam (ATIVAN) 0.5 MG tablet Take 0.5 mg by mouth every 8 hours as needed      metoprolol succinate ER (TOPROL XL) 25 MG 24 hr tablet Take 12.5 mg by mouth 2 times daily      morphine (MSIR) 15 MG IR tablet TAKE 1 TO 2 TABLETS BY MOUTH EVERY 4 HOURS AS NEEDED FOR MODERATE PAIN      omeprazole (PRILOSEC) 40 MG DR capsule Take 40 mg by mouth daily before breakfast      ondansetron (ZOFRAN) 8 MG tablet Take 8 mg by mouth every 8 hours as needed      prochlorperazine (COMPAZINE) 10 MG tablet Take 10 mg by mouth every 6 hours as needed      senna-docusate (SENOKOT-S/PERICOLACE) 8.6-50 MG tablet Take 1 tablet by mouth      sertraline  (ZOLOFT) 50 MG tablet Take 50 mg by mouth      tamsulosin (FLOMAX) 0.4 MG capsule Take 0.4 mg by mouth        Physical Exam:   General: patient appears well in no acute distress, alert and oriented, speech clear and fluid  Skin: no visualized rash or lesions on visualized skin  Resp: Appears to be breathing comfortably without accessory muscle usage, speaking in full sentences, no audible wheezes or cough.  Psych: Coherent speech, normal rate and volume, able to articulate logical thoughts, able to abstract reason, no tangential thoughts, no hallucinations or delusions  Patient's affect is appropriate.    Laboratory and Imaging Studies:   Labs drawn at [facility] 6/13 reviewed and notable for: WBC 11.6, Hgb 8.9, ANC 9.8, , K 4.1, albumin 2.9, protein 5.9. CA 19-9 is pending at this time.     CT A/P (6/11/23):    1. With comparison to previous exam there has been interval decrease in the   enhancement of the mucosal walls of the sigmoid colon however there appears to   be slight interval increase in the edema within those walls.  This causes some   narrowing of the lumen with apparent retained liquid and stool proximal to the   edematous region.  This is best appreciated on series 2/#52.  Findings are   consistent with C. Difficile colitis.  There is interval development of small   bilateral paracolic gutter edema.  No obstruction to the small bowel although   there are multiple fluid-filled loops.   2.  Slight interval decrease in size of the tail of pancreas mass.   3.  Focal lesion along the mid left ureter measure slightly differently today   but is essentially morphologically unchanged.  Differences are likely due to   variability in measurement technique.   4.  No free air.     I have reviewed the CT report from [facility].      Assessment/Plan:   ##Metastatic Pancreatic Cancer:   History as above. Receiving palliative FOLFIRINOX, with cycle 3 5/18 under the care of Dr. Engle at [facility]. Plan to  switch to Gemcitabine/Abraxane given toxicities from prior therapy. CA 19-9 checked yesterday and is pending.  CT 6/12 shows interval decrease in size of pancreatic mass. He was referred to the [facility] to be evaluated for clinical trial. Patient opted to move forward with pre-screening for TCR2 clinical trial, including MSLN testing on archival tissue. This resulted + for MSLN. Plan was to undergo leukophoresis on 6/8. However, this was canceled due to admission for C. Diff infection. I am seeing him today to assess clinical condition and to determine if he can safely move forward with leukophoresis at this time.     We reviewed the TCR2 trial again today. We discussed this is a phase I trial to establish the safety, tolerability and recommended phase II dose of TC-510.  CAR-T cells are engineered T-cells that allow the T-cells to identify a specific antigen on the cancer cell and therefore attack it. His T-cells would be collected through a process called leukapheresis.  The T-cells are then engineered to express an antibody against mesothelin which is expressed on the surface of cancer cells.      His T-cells would be collected, after which it is anticipated it will take 6-8 weeks to  the CAR T-cells and during this time, he should continue routinely prescribed cancer therapy.  When the CAR T-cells are ready, he would be given given lymphodepleting chemotherapy for 4 consecutive days (days -7 thru -4).  Patients then receive TC-510 infusion on day 0.  Patients are hospitalized overnight after TC-510 infusion and must remain within 30 miles of the medical center at which treatment was received for approximately 8-12 weeks.  Patients must have a dedicated caregiver during this time and patients will be instructed not to drive.      TC-510 is a one-time only treatment.  Risks of treatment include, but are not limited to cytokine release syndrome - fever, chills, headaches, fatigue, arthralgia, nausea,  vomiting, hypotension, rash, shortness of breath, encephalopathy.  Frequent follow up visits and labs are performed in the days and weeks following treatment.  He expressed understanding of this.     We reviewed the eligibility criteria for leukophoresis, including: ECOG of 0 or 1, PLT > or = 100, hgb > or = 9. Must not recieved cytotoxic chemotherapy within 3 weeks of leukapheresis and therapeutic doses of steroids must be stopped > 72 hours prior to leukapheresis. Currently his hgb is 8.9. Discussed would need to be 9.0 to move forward. Infection is not technically exclusionary. However, would want to make sure he has recovered prior to moving forward. I discussed that I will review his case with the PI, Dr. Chauhan, and call them back with any additional recommendations.     Addendum 1120: I discussed the patient with Dr. Chauhan who recommended the he received 1 month of gemcitabine/abraxane to ensure stability- improved infection and tolerating therapy without significant acute toxicities. If stable at this time and otherwise meets eligibility criteria, could move forward with wash out for leukophoresis at this point. Plan for him to follow-up with Dr. Chauhan or myself in 4 weeks to assess stability. This was discussed with the patient's daughter who in agreement with this plan. These recommendations were also communicated with Dr. Engle's team.     ##Leukocytosis:   ##C. Diff Colitis:   C. Diff diagnosis 5/25 with admission 6/01/23- 06/07/23 for acute C. difficile colitis, abdominal pain, ileus. Treated with oral and rectal vancomycin. Again admitted from 06/11/23-06/12/22 for nausea and vomiting. Again treated with oral and rectal vancomycin. Today he notes ongoing abdominal pain and bloating.  Did have some nausea this morning.  No vomiting.  He is passing gas and stool.  States yesterday he had 10-15 very small volume stools.  Stool more firm today. States was having 4-5 stool/day in the hospital.  However, these were larger volume so unclear if overall volume of his stools is changed. Denies overt fevers. Feeling better at the time of video call.  He continues on oral vancomycin.  He was prescribed an additional 7 days upon discharge from the hospital. Eating ok. Trying to push himself to drink more. Denies dizziness. Labs from 6/13 show slightly leukocytosis of 11.6.  Discussed this is concerning in the setting of active c. Diff infection with increased stools. Although unclear if actual volume changes. Difficult to assess over virtual visit. I recommend they monitor him closely today. Recommend evaluation in the ED with fever, uncontrolled nausea, vomiting, worsening abdominal pain, dizziness, increased stool out-put, or any new or concerning symptoms. He otherwise denies new signs of infection. He does have follow-up for evaluation in oncology clinic tomorrow. Daughter plans to pursue ID consult.       ECOG PS: 1    80 minutes spent by me on the date of the encounter doing chart review, review of outside records, review of test results, interpretation of tests, patient visit, documentation and discussion with study team.      ALESIA Mckeon, CNP  [facility], [facility]   [facility]  Clinical Trials Office

## 2023-07-11 NOTE — PROGRESS NOTES
Virtual Visit Details     Type of service: Video Visit     Originating Location (pt. Location): Home  Distant Location (provider location): Off-site  Platform used for Video Visit: Oscar  Visit Start Time: 8:47  Visit End Time: 9:25       [facility]      PATIENT NAME: Jaren Morales   MRN # [id number]  DATE OF VISIT: Jul 12, 2023   YOB: 1955    Study: A PHASE 1/2 SINGLE ARM OPEN-LABEL CLINICAL TRIAL OF TC-510 IN PATIENTS WITH ADVANCED MESOTHELIN-EXPRESSING CANCER     Reason for Visit: Jaren Morales is a 67 year old male with a history of Metastatic Pancreatic Cancer who is being evaluated by video visit to discuss TCR2 clinical trial.      Oncology History: See Dr. Chauhan's note from 5/12/23 for more details regarding oncology history.     03/08/23-03/18/23- Presented for progressive abdominal discomfort, nausea and vomiting. Was noted to have left-sided hydronephrosis and obstructive uropathy likely related to a pancreatic tail mass.  Admitted for Staphylococcus epidermidis bacteremia and left UPJ obstruction likely 2/2 malignancy. He underwent bilateral ureteral stent placement on 3/8/2023. He was treated with 2 weeks of IV Vanco and subsequently 4 weeks of Zyvox.   03/20/23- Biopsy of Pancreatic Mass- Adenocarcinoma, morphologic and immunohistochemical profile supportive of pancreatic adenocarcinoma although diagnostic cellular material is minimal.   03/29/23- Colonoscopy showed mass in the rectosigmoid colon, remainder of the colon was not evaluated. Pathology: Adenocarcinoma, metastatic consistent with pancreatic primary  04/03/23- Started palliative chemotherapy with FOLFIRINOX, cycle 3 5/18 04/14/23- 04/19/23- Admitted for Chemotherapy-induced nausea, vomiting and diarrhea. Acute severe pancytopenia secondary to chemotherapy.   05/12/23- Date of Consent for prescreening process for TCR2 clinical trial. MSLN testing on archival tissue. Tissue + for MSLN.   05/22/23-  5/25/23- Admitted for UTI, treated with IV antibiotics and discharged on a 7 day course of Cefdinir. Ureteral stent exchange 5/23/23. C. Diff + and started on oral Vancomycin.   06/01/23- 06/07/23- Admitted for acute C. difficile colitis, abdominal pain, ileus. Treated with oral and rectal Vancomycin. Discharged on an 8 day course of Vancomycin     06/11/23-06/12/23- Admitted to observation for nausea and vomiting.  Treated with oral and rectal Vancomycin.  06/14/23- Met with DTC virtually to discuss next steps regarding TCR2 clinical trial. Recommendation was for one month of gemcitabine/abraxane. If stable (no hospitalization or serious infections) we could move forward with wash out for leukapharesis at that time.   06/19/23- Started gemcitabine and Abraxane, with C1D8 given on 6/26 06/28/23- 07/04/23- Admitted for septic shock, required vasopressor therapy in the intensive care unit.  BCx + for E. Coli. S/p left ureteral stent exchange on 6/29 and had placement of a right ureteral stent. He required 1 unit of PRBCs, 1 unit of platelets and filgrastim for neutropenia.  He was discharged on a 14 day course of IV Rocephin for the treatment of E. coli septicemia.    07/12/23- Met with DTC team to discuss TCR2 clinical trial   Interval History:    I met with Howie and his daughter virtually today to discussed TCR2 clinical trial. We last met on 6/14. At which time recommendation was for one month of gemcitabine/abraxane. If stable at this time we could move forward with wash out for leukophoresis. Unfortunately, he required the above admission.     Howie is seen resting in bed today. He continues on IV Rocephin and has improved on that. Has been resting more since discharge from the hospital to allow for time to heal. He does spent a great deal of the day in bed. Gets out of the house to  prescriptions and go to medical appointments. He met with his primary oncologist yesterday. Per patient and his daughter,  she recommended every other week chemotherapy versus weekly.     Appetite is low but improving. He is on suppressive vancomycin to prevent C. Diff recurrence while on antibiotics.     We reviewed the trial again today, including: leukapheresis, T-cell engineering, lymphodepleting chemotherapy, and hospitalization. Given significant toxicities, infections, and hospitalization with SOC therapy, we are concerned about the safety of the trial. I reviewed his case at DTC meeting yesterday. Recommendation that we hold off on the trial at this time, given recent infection, hospitalization, and pancytopenia. If he is stable for 4 weeks and feels ready to proceed with the trial, recommend the patient and his daugther reach out to our trial team.  The patient does continue to be motivated to move forward with the trial. His daughter wonders if he opts to not have further chemotherapy and is stable with this, if we could proceed with the trial at this point. I discussed that this is unlikely as this would be an extended period off therapy and would worry about significant disease progression, especially given the 6-8 week period we are waiting for product to mature. However, I let them know I would discuss this option with Dr. Chauhan and call them back with these recommendations.     We also discussed performance status would also need to be 0-1, which means he would have to be able to do most of the activities he was doing prior to cancer diagnosis. Would want him to be out of bed or chair > 50 % of the day.       History:  PMH:  Stage IV Pancreatic cancer with mets to the colon- on FOLFIRINOX starting 4/20/2023. Most recent dose given on 5/17/2023.  CAD- s/p CABG in 2015-on baby aspirin daily and atorvastatin nightly  HTN- on losartan and metoprolol  Depression and anxiety- takes sertraline   Complicated UTI with coagulase-negative staph septicemia 3/2023- treated with 6 weeks of antimicrobial therapy.   Ureteral  obstruction- left ureteral stent  (exchanged 6/29) and right ureteral stent placement 6/29.    C. Diff Colitis- 5/25/23, started on oral vancomycin. Admitted 6/1-6/7 due to C. Diff colitis and ileus   Chemotherapy Induced Nausea and Vomiting   E Coli UTI with BCx + for E. Coli. S/p left ureteral stent exchange on 6/29 and  placement of a right ureteral stent.  Social History     Socioeconomic History     Marital status:      Spouse name: Not on file     Number of children: Not on file     Years of education: Not on file     Highest education level: Not on file   Occupational History     Not on file   Tobacco Use     Smoking status: Every Day     Types: Cigarettes     Smokeless tobacco: Not on file   Substance and Sexual Activity     Alcohol use: Never     Drug use: Never     Sexual activity: Not on file   Other Topics Concern     Not on file   Social History Narrative     Not on file     Social Determinants of Health     Financial Resource Strain: Not on file   Food Insecurity: Not on file   Transportation Needs: Not on file   Physical Activity: Not on file   Stress: Not on file   Social Connections: Not on file   Intimate Partner Violence: Not on file   Housing Stability: Not on file     Allergies:  No Known Allergies   Current Medications:   Current Outpatient Medications   Medication Sig Dispense Refill     acetaminophen (TYLENOL) 500 MG tablet Take 1,000 mg by mouth       alum & mag hydroxide-simethicone (MAALOX) 200-200-20 MG/5ML SUSP suspension Take 30 mLs by mouth       atorvastatin (LIPITOR) 40 MG tablet Take 40 mg by mouth       Contour Next EZ (CONTOUR NEXT EZ W/DEVICE KIT) w/Device KIT USE TO CHECK GLUCOSE IN THE MORNING       CONTOUR NEXT TEST test strip 1 strip daily       CREON 77340-11647 units CPEP per EC capsule TAKE 4 CAPSULES BY MOUTH THREE TIMES DAILY WITH MEALS       dexamethasone (DECADRON) 2 MG tablet Take 2 mg by mouth       diphenhydrAMINE-acetaminophen (TYLENOL PM)  MG tablet  Take 2 tablets by mouth       loperamide (IMODIUM) 2 MG capsule TAKE 2 CAPSULES WITH FIRST DIARRHEA, THEN 1 CAPSULE WITH EACH ADDITIONAL DIARRHEA, MAX 8 CAPS PER DAY       LORazepam (ATIVAN) 0.5 MG tablet Take 0.5 mg by mouth every 8 hours as needed       metoprolol succinate ER (TOPROL XL) 25 MG 24 hr tablet Take 12.5 mg by mouth 2 times daily       morphine (MSIR) 15 MG IR tablet TAKE 1 TO 2 TABLETS BY MOUTH EVERY 4 HOURS AS NEEDED FOR MODERATE PAIN       omeprazole (PRILOSEC) 40 MG DR capsule Take 40 mg by mouth daily before breakfast       ondansetron (ZOFRAN) 8 MG tablet Take 8 mg by mouth every 8 hours as needed       prochlorperazine (COMPAZINE) 10 MG tablet Take 10 mg by mouth every 6 hours as needed       senna-docusate (SENOKOT-S/PERICOLACE) 8.6-50 MG tablet Take 1 tablet by mouth       sertraline (ZOLOFT) 50 MG tablet Take 50 mg by mouth       tamsulosin (FLOMAX) 0.4 MG capsule Take 0.4 mg by mouth        Physical Exam:   General: patient appears well in no acute distress, alert and oriented, speech clear and fluid. Seen in laying down in bed.   Skin: no visualized rash or lesions on visualized skin  Resp: Appears to be breathing comfortably without accessory muscle usage, speaking in full sentences, no audible wheezes or cough.  Psych: Coherent speech, normal rate and volume, able to articulate logical thoughts, able to abstract reason, no tangential thoughts, no hallucinations or delusions  Patient's affect is appropriate.    Laboratory and Imaging Studies:   CBC and CMP drawn on 7/11/23  at [facility] reviewed and notable for: WBC 9.8, Hgb 10.2, .      CT A/P (6/11/23):    1. With comparison to previous exam there has been interval decrease in the   enhancement of the mucosal walls of the sigmoid colon however there appears to   be slight interval increase in the edema within those walls.  This causes some   narrowing of the lumen with apparent retained liquid and stool proximal to the    edematous region.  This is best appreciated on series 2/#52.  Findings are   consistent with C. Difficile colitis.  There is interval development of small   bilateral paracolic gutter edema.  No obstruction to the small bowel although   there are multiple fluid-filled loops.   2.  Slight interval decrease in size of the tail of pancreas mass.   3.  Focal lesion along the mid left ureter measure slightly differently today   but is essentially morphologically unchanged.  Differences are likely due to   variability in measurement technique.   4.  No free air.     CT A/P 06/28/2023    IMPRESSION:     Interval placement of a left ureteral stent. No hydronephrosis on the left.   Urinary bladder is incompletely distended and contains air potentially from   recent catheterization.     Bladder wall thickening probably from incomplete distention or less likely   cystitis.     Right renal atrophy and mild hydronephrosis is stable.     Probable hyperdense cyst in the left kidney measuring 1.1 cm. Increased size to   a partially exophytic cyst superior to this in the mid to upper pole left kidney   measuring 3.3 cm. Follow-up ultrasound evaluation of left kidney can be   considered.     Nonspecific wall thickening to the sigmoid colon and sigmoid rectal junction   possibly from mild colitis. There is no bowel obstruction.     Moderate superior endplate compression of L1 is increased in the interval.      I have reviewed the CT report from [facility].      Assessment/Plan:   ##Metastatic Pancreatic Cancer:   History as above. He ws treated with 3 cycles of palliative FOLFIRINOX (cycle 3 5/18) and was switched to gemcitabine and Abraxane on 6/19/23 due to toxicities from FOLFIRINOX. He received C1D1 on 6/16 and C1D8 on 6/26. As above, unfortunately was admitted 6/28/23- 07/04/23 for septic shock, required vasopressor therapy in the intensive care unit.  BCx + for E. Coli. S/p left ureteral stent exchange on 6/29 and had  placement of a right ureteral stent. He required 1 unit of PRBCs, 1 unit of platelets and filgrastim for neutropenia.  He was discharged on a 14 day course of IV Rocephin for the treatment of E. coli septicemia.  Primary Oncologist is Dr. Engle at [facility]. He was referred to the [facility] to be evaluated for clinical trial. Patient opted to move forward with pre-screening for TCR2 clinical trial, including MSLN testing on archival tissue. This resulted + for MSLN. Plan was to undergo leukophoresis on 6/8. However, this was canceled due to admission for C. Diff infection. He was seen by DTC team on 6/14. At this time, his primary oncologist planned to switch to Gemcitabine/Abraxane. We discussed that if able to tolerate therapy and stable for 1 month, would consider moving forward with leukapheresis. Unfortunately, he did require admission as above and remains on IV antibiotics at this time. Furthermore, PS has declined to 2-3 currently.     We reviewed the TCR2 trial again today. We discussed this is a phase I trial. His T-cells would be collected through a process called leukapheresis.  The T-cells are then engineered to express an antibody against mesothelin which is expressed on the surface of cancer cells.  His T-cells would be collected, after which it is anticipated it will take 6-8 weeks to  the CAR T-cells and during this time, he should continue routinely prescribed cancer therapy.  When the CAR T-cells are ready, he would be given lymphodepleting chemotherapy followed by TC-510 infusion. TC-510 is a one-time only treatment.  Risks of treatment include, but are not limited to: infection, cytokine release syndrome - fever, chills, headaches, fatigue, arthralgia, nausea, vomiting, hypotension, rash, shortness of breath, encephalopathy.     We reviewed the eligibility criteria for leukophoresis, including: ECOG of 0 or 1, PLT > or = 100, hgb > or = 9. Currently his labs are within an acceptable  range. However, his PS has declined. Also, given infection and decling PS as well as inability to tolerate SOC therapy, I am concerned about his ability to tolerate trial procedures.     We did discuss his case at DTC meeting yesterday. Recommend not moving forward at this time. However, should he remain stable in the future, recommend the patient reach out to [facility], to discuss trial. His daughter did question if he opts not to receive further SOC therapy and is stable in this case, could he then consider the trial. I did discuss this with Dr. Chauhan who did not recommend this due to concern for significant disease progression. Patient to continue disease management with primary oncologist, if PS improves and no further hospitalizations or serious infection, could consider the trial in the future.        ECOG PS: 2-3    70 minutes spent by me on the date of the encounter doing chart review, review of outside records, review of test results, interpretation of tests, patient visit, documentation and discussion with study team.       ALESIA Mckeon, CNP  [facility], [facility]   [facility]  Clinical Trials Office

## 2023-07-12 NOTE — LETTER
7/12/2023         RE: Jaren Morales  [address] [place] [address]        Dear Colleague,    Thank you for referring your patient, Jaren Morales, to the [facility]. Please see a copy of my visit note below.    Virtual Visit Details     Type of service: Video Visit     Originating Location (pt. Location): Home  Distant Location (provider location): Off-site  Platform used for Video Visit: AmWell  Visit Start Time: 8:47  Visit End Time: 9:25       DEVELOPMENTAL THERAPEUTICS CLINIC      PATIENT NAME: Jaren Morales   MRN # [id number]  DATE OF VISIT: Jul 12, 2023   YOB: 1955    Study: A PHASE 1/2 SINGLE ARM OPEN-LABEL CLINICAL TRIAL OF TC-510 IN PATIENTS WITH ADVANCED MESOTHELIN-EXPRESSING CANCER     Reason for Visit: Jaren Morales is a 67 year old male with a history of Metastatic Pancreatic Cancer who is being evaluated by video visit to discuss TCR2 clinical trial.      Oncology History: See Dr. Chauhan's note from 5/12/23 for more details regarding oncology history.     03/08/23-03/18/23- Presented for progressive abdominal discomfort, nausea and vomiting. Was noted to have left-sided hydronephrosis and obstructive uropathy likely related to a pancreatic tail mass.  Admitted for Staphylococcus epidermidis bacteremia and left UPJ obstruction likely 2/2 malignancy. He underwent bilateral ureteral stent placement on 3/8/2023. He was treated with 2 weeks of IV Vanco and subsequently 4 weeks of Zyvox.   03/20/23- Biopsy of Pancreatic Mass- Adenocarcinoma, morphologic and immunohistochemical profile supportive of pancreatic adenocarcinoma although diagnostic cellular material is minimal.   03/29/23- Colonoscopy showed mass in the rectosigmoid colon, remainder of the colon was not evaluated. Pathology: Adenocarcinoma, metastatic consistent with pancreatic primary  04/03/23- Started palliative chemotherapy with FOLFIRINOX, cycle 3 5/18 04/14/23- 04/19/23- Admitted  for Chemotherapy-induced nausea, vomiting and diarrhea. Acute severe pancytopenia secondary to chemotherapy.   05/12/23- Date of Consent for prescreening process for TCR2 clinical trial. MSLN testing on archival tissue. Tissue + for MSLN.   05/22/23- 5/25/23- Admitted for UTI, treated with IV antibiotics and discharged on a 7 day course of Cefdinir. Ureteral stent exchange 5/23/23. C. Diff + and started on oral Vancomycin.   06/01/23- 06/07/23- Admitted for acute C. difficile colitis, abdominal pain, ileus. Treated with oral and rectal Vancomycin. Discharged on an 8 day course of Vancomycin     06/11/23-06/12/23- Admitted to observation for nausea and vomiting.  Treated with oral and rectal Vancomycin.  06/14/23- Met with DTC virtually to discuss next steps regarding TCR2 clinical trial. Recommendation was for one month of gemcitabine/abraxane. If stable (no hospitalization or serious infections) we could move forward with wash out for leukapharesis at that time.   06/19/23- Started gemcitabine and Abraxane, with C1D8 given on 6/26 06/28/23- 07/04/23- Admitted for septic shock, required vasopressor therapy in the intensive care unit.  BCx + for E. Coli. S/p left ureteral stent exchange on 6/29 and had placement of a right ureteral stent. He required 1 unit of PRBCs, 1 unit of platelets and filgrastim for neutropenia.  He was discharged on a 14 day course of IV Rocephin for the treatment of E. coli septicemia.    07/12/23- Met with DTC team to discuss TCR2 clinical trial   Interval History:    I met with Howie and his daughter virtually today to discussed TCR2 clinical trial. We last met on 6/14. At which time recommendation was for one month of gemcitabine/abraxane. If stable at this time we could move forward with wash out for leukophoresis. Unfortunately, he required the above admission.     Howie is seen resting in bed today. He continues on IV Rocephin and has improved on that. Has been resting more since  discharge from the hospital to allow for time to heal. He does spent a great deal of the day in bed. Gets out of the house to  prescriptions and go to medical appointments. He met with his primary oncologist yesterday. Per patient and his daughter, she recommended every other week chemotherapy versus weekly.     Appetite is low but improving. He is on suppressive vancomycin to prevent C. Diff recurrence while on antibiotics.     We reviewed the trial again today, including: leukapheresis, T-cell engineering, lymphodepleting chemotherapy, and hospitalization. Given significant toxicities, infections, and hospitalization with SOC therapy, we are concerned about the safety of the trial. I reviewed his case at DTC meeting yesterday. Recommendation that we hold off on the trial at this time, given recent infection, hospitalization, and pancytopenia. If he is stable for 4 weeks and feels ready to proceed with the trial, recommend the patient and his daugther reach out to our trial team.  The patient does continue to be motivated to move forward with the trial. His daughter wonders if he opts to not have further chemotherapy and is stable with this, if we could proceed with the trial at this point. I discussed that this is unlikely as this would be an extended period off therapy and would worry about significant disease progression, especially given the 6-8 week period we are waiting for product to mature. However, I let them know I would discuss this option with Dr. Chauhan and call them back with these recommendations.     We also discussed performance status would also need to be 0-1, which means he would have to be able to do most of the activities he was doing prior to cancer diagnosis. Would want him to be out of bed or chair > 50 % of the day.       History:  PMH:  Stage IV Pancreatic cancer with mets to the colon- on FOLFIRINOX starting 4/20/2023. Most recent dose given on 5/17/2023.  CAD- s/p CABG in  2015-on baby aspirin daily and atorvastatin nightly  HTN- on losartan and metoprolol  Depression and anxiety- takes sertraline   Complicated UTI with coagulase-negative staph septicemia 3/2023- treated with 6 weeks of antimicrobial therapy.   Ureteral obstruction- left ureteral stent  (exchanged 6/29) and right ureteral stent placement 6/29.    C. Diff Colitis- 5/25/23, started on oral vancomycin. Admitted 6/1-6/7 due to C. Diff colitis and ileus   Chemotherapy Induced Nausea and Vomiting   E Coli UTI with BCx + for E. Coli. S/p left ureteral stent exchange on 6/29 and  placement of a right ureteral stent.  Social History     Socioeconomic History    Marital status:      Spouse name: Not on file    Number of children: Not on file    Years of education: Not on file    Highest education level: Not on file   Occupational History    Not on file   Tobacco Use    Smoking status: Every Day     Types: Cigarettes    Smokeless tobacco: Not on file   Substance and Sexual Activity    Alcohol use: Never    Drug use: Never    Sexual activity: Not on file   Other Topics Concern    Not on file   Social History Narrative    Not on file     Social Determinants of Health     Financial Resource Strain: Not on file   Food Insecurity: Not on file   Transportation Needs: Not on file   Physical Activity: Not on file   Stress: Not on file   Social Connections: Not on file   Intimate Partner Violence: Not on file   Housing Stability: Not on file     Allergies:  No Known Allergies   Current Medications:   Current Outpatient Medications   Medication Sig Dispense Refill    acetaminophen (TYLENOL) 500 MG tablet Take 1,000 mg by mouth      alum & mag hydroxide-simethicone (MAALOX) 200-200-20 MG/5ML SUSP suspension Take 30 mLs by mouth      atorvastatin (LIPITOR) 40 MG tablet Take 40 mg by mouth      Contour Next EZ (CONTOUR NEXT EZ W/DEVICE KIT) w/Device KIT USE TO CHECK GLUCOSE IN THE MORNING      CONTOUR NEXT TEST test strip 1 strip  daily      CREON 54032-85920 units CPEP per EC capsule TAKE 4 CAPSULES BY MOUTH THREE TIMES DAILY WITH MEALS      dexamethasone (DECADRON) 2 MG tablet Take 2 mg by mouth      diphenhydrAMINE-acetaminophen (TYLENOL PM)  MG tablet Take 2 tablets by mouth      loperamide (IMODIUM) 2 MG capsule TAKE 2 CAPSULES WITH FIRST DIARRHEA, THEN 1 CAPSULE WITH EACH ADDITIONAL DIARRHEA, MAX 8 CAPS PER DAY      LORazepam (ATIVAN) 0.5 MG tablet Take 0.5 mg by mouth every 8 hours as needed      metoprolol succinate ER (TOPROL XL) 25 MG 24 hr tablet Take 12.5 mg by mouth 2 times daily      morphine (MSIR) 15 MG IR tablet TAKE 1 TO 2 TABLETS BY MOUTH EVERY 4 HOURS AS NEEDED FOR MODERATE PAIN      omeprazole (PRILOSEC) 40 MG DR capsule Take 40 mg by mouth daily before breakfast      ondansetron (ZOFRAN) 8 MG tablet Take 8 mg by mouth every 8 hours as needed      prochlorperazine (COMPAZINE) 10 MG tablet Take 10 mg by mouth every 6 hours as needed      senna-docusate (SENOKOT-S/PERICOLACE) 8.6-50 MG tablet Take 1 tablet by mouth      sertraline (ZOLOFT) 50 MG tablet Take 50 mg by mouth      tamsulosin (FLOMAX) 0.4 MG capsule Take 0.4 mg by mouth        Physical Exam:   General: patient appears well in no acute distress, alert and oriented, speech clear and fluid. Seen in laying down in bed.   Skin: no visualized rash or lesions on visualized skin  Resp: Appears to be breathing comfortably without accessory muscle usage, speaking in full sentences, no audible wheezes or cough.  Psych: Coherent speech, normal rate and volume, able to articulate logical thoughts, able to abstract reason, no tangential thoughts, no hallucinations or delusions  Patient's affect is appropriate.    Laboratory and Imaging Studies:   CBC and CMP drawn on 7/11/23  at [facility] reviewed and notable for: WBC 9.8, Hgb 10.2, .      CT A/P (6/11/23):    1. With comparison to previous exam there has been interval decrease in the   enhancement of the  mucosal walls of the sigmoid colon however there appears to   be slight interval increase in the edema within those walls.  This causes some   narrowing of the lumen with apparent retained liquid and stool proximal to the   edematous region.  This is best appreciated on series 2/#52.  Findings are   consistent with C. Difficile colitis.  There is interval development of small   bilateral paracolic gutter edema.  No obstruction to the small bowel although   there are multiple fluid-filled loops.   2.  Slight interval decrease in size of the tail of pancreas mass.   3.  Focal lesion along the mid left ureter measure slightly differently today   but is essentially morphologically unchanged.  Differences are likely due to   variability in measurement technique.   4.  No free air.     CT A/P 06/28/2023    IMPRESSION:     Interval placement of a left ureteral stent. No hydronephrosis on the left.   Urinary bladder is incompletely distended and contains air potentially from   recent catheterization.     Bladder wall thickening probably from incomplete distention or less likely   cystitis.     Right renal atrophy and mild hydronephrosis is stable.     Probable hyperdense cyst in the left kidney measuring 1.1 cm. Increased size to   a partially exophytic cyst superior to this in the mid to upper pole left kidney   measuring 3.3 cm. Follow-up ultrasound evaluation of left kidney can be   considered.     Nonspecific wall thickening to the sigmoid colon and sigmoid rectal junction   possibly from mild colitis. There is no bowel obstruction.     Moderate superior endplate compression of L1 is increased in the interval.      I have reviewed the CT report from [facility].      Assessment/Plan:   ##Metastatic Pancreatic Cancer:   History as above. He ws treated with 3 cycles of palliative FOLFIRINOX (cycle 3 5/18) and was switched to gemcitabine and Abraxane on 6/19/23 due to toxicities from FOLFIRINOX. He received C1D1 on 6/16 and  C1D8 on 6/26. As above, unfortunately was admitted 6/28/23- 07/04/23 for septic shock, required vasopressor therapy in the intensive care unit.  BCx + for E. Coli. S/p left ureteral stent exchange on 6/29 and had placement of a right ureteral stent. He required 1 unit of PRBCs, 1 unit of platelets and filgrastim for neutropenia.  He was discharged on a 14 day course of IV Rocephin for the treatment of E. coli septicemia.  Primary Oncologist is Dr. Engle at [facility]. He was referred to the [facility] to be evaluated for clinical trial. Patient opted to move forward with pre-screening for TCR2 clinical trial, including MSLN testing on archival tissue. This resulted + for MSLN. Plan was to undergo leukophoresis on 6/8. However, this was canceled due to admission for C. Diff infection. He was seen by DTC team on 6/14. At this time, his primary oncologist planned to switch to Gemcitabine/Abraxane. We discussed that if able to tolerate therapy and stable for 1 month, would consider moving forward with leukapheresis. Unfortunately, he did require admission as above and remains on IV antibiotics at this time. Furthermore, PS has declined to 2-3 currently.     We reviewed the TCR2 trial again today. We discussed this is a phase I trial. His T-cells would be collected through a process called leukapheresis.  The T-cells are then engineered to express an antibody against mesothelin which is expressed on the surface of cancer cells.  His T-cells would be collected, after which it is anticipated it will take 6-8 weeks to  the CAR T-cells and during this time, he should continue routinely prescribed cancer therapy.  When the CAR T-cells are ready, he would be given lymphodepleting chemotherapy followed by TC-510 infusion. TC-510 is a one-time only treatment.  Risks of treatment include, but are not limited to: infection, cytokine release syndrome - fever, chills, headaches, fatigue, arthralgia, nausea, vomiting,  hypotension, rash, shortness of breath, encephalopathy.     We reviewed the eligibility criteria for leukophoresis, including: ECOG of 0 or 1, PLT > or = 100, hgb > or = 9. Currently his labs are within an acceptable range. However, his PS has declined. Also, given infection and decling PS as well as inability to tolerate SOC therapy, I am concerned about his ability to tolerate trial procedures.     We did discuss his case at DTC meeting yesterday. Recommend not moving forward at this time. However, should he remain stable in the future, recommend the patient reach out to [facility], to discuss trial. His daughter did question if he opts not to receive further SOC therapy and is stable in this case, could he then consider the trial. I did discuss this with Dr. Chauhan who did not recommend this due to concern for significant disease progression. Patient to continue disease management with primary oncologist, if PS improves and no further hospitalizations or serious infection, could consider the trial in the future.        ECOG PS: 2-3    70 minutes spent by me on the date of the encounter doing chart review, review of outside records, review of test results, interpretation of tests, patient visit, documentation and discussion with study team.       ALESIA Mckeon, CNP  [facility] [place], [facility]   [facility]  Clinical Trials Office              Virtual Visit Details    Type of service:  Video Visit     Originating Location (pt. Location): Home  Distant Location (provider location):  Off-site  Platform used for Video Visit: Oscar    Could not edit Provider notes, Incomplete

## 2023-07-12 NOTE — NURSING NOTE
Is the patient currently in the state of [place]? YES    Visit mode:VIDEO    If the visit is dropped, the patient can be reconnected by: VIDEO VISIT: Text to cell phone: [phone] or [phone]    Will anyone else be joining the visit? Yes, daughter Angela is there with him.      How would you like to obtain your AVS? Mail a copy    Are changes needed to the allergy or medication list? NO    Reason for visit: RECHECK (Video visit )  Diane Chavez

## 2023-07-12 NOTE — PROGRESS NOTES
Virtual Visit Details    Type of service:  Video Visit     Originating Location (pt. Location): Home  Distant Location (provider location):  Off-site  Platform used for Video Visit: Oscar    Could not edit Provider notes, Incomplete

## 2023-10-06 NOTE — TELEPHONE ENCOUNTER
Spoke with Pancho Morales, the daughter of the patient about current clinical trial opportunities.  I told her we did not have current openings for his disease type, but I would let her and her dad know as soon as something opens.  She updated me on his increased strength and nutrition and I encouraged them to keep up the goo work!

## 2023-11-09 NOTE — PROGRESS NOTES
[facility] - Medical Oncology New Outpatient Consult Note  2023    Patient Identifiers     Name: Jaren Morales  Preferred Address: [place]  Preferred Language: English  : 1955  Gender: male    Assessment and Plan     Mr. Jaren Morales is a 67 year old male extensive smoking history with prior history of HTN, and metastatic pancreas cancer on FOLFIRINOX chemotherapy who presents for second opinion.    Despite challenges with initial cycle of chemotherapy, we generally agree with initial treatment with FOLFIRINOX chemotherapy for patient's metastatic pancreas cancer.  Further, we agree with dose reduction and G-CSF support to prevent further cytopenias and future admissions for febrile neutropenia.  Additional testing for the DPyD deficiency may also be appropriate given the severity of patient's symptoms, and may help guide future therapy.  Patient's persistent esophageal symptoms may be indicative of new pathology which may benefit from endoscopic evaluation.  We defer this to further discussion between patient and primary oncologist.    While it is appropriate to consider clinical trial for first-line therapy in metastatic pancreas cancer, we would generally recommend to wait until NGS testing has returned, currently sent to [facility].  This may help to identify specific genetic alterations which may benefit from specific targeted therapies.  Given the recent opening of TCR2 clinical trial, we will also begin screening patient for this.    Patient has been established with our clinical trials team at this time, and we will reach out as trial space becomes available in future.  Dr. Engle may refer patient back to our clinic if further questions arise.    Plan:  Metastatic pancreas cancer  - agree with dose-reduced FOLFIRINOX chemotherapy  - DPYD deficiency testing; consider significant 5FU dose reduction (or hold) if functional mutation noted  - trend CBC, CMP,   - pt  "placed on waitlist for HCW and TCR2 clinical trials    Throat pain  - consider endoscopy to evaluate symptoms    The patient and family asked numerous excellent questions which I answered to the best of my abilities. At the completion of our consultation, the patient and accompanying caregivers had a strong understanding of the plan. They have our contact information for any further questions or concerns, and know to reach out for any urgent matters. Patient and family aware that they should call 911 for emergencies.       60 minutes spent on the date of the encounter doing chart review, review of test results, interpretation of tests, patient visit and documentation       Go Chauhan MD, PhD   of Medicine  Division of Hematology, Oncology and Transplantation  [facility]    -----------------------------------    Oncology Summary and HPI      Cancer Staging   No matching staging information was found for the patient.    Oncology History    No history exists.       Subjective/Interval Events     - pt understands the nature of his diagnosis  - his voice was somewhat hoarse following hospitalization, with decreased appetite and significant vomiting    - pt feels persistently weak  - has consistent nausea since treatment initiation    - Aunt w/ pancreas/colon cancer. Grandparent w/ aplastic anemia    Review of Systems: 14 point ROS negative other than the symptoms noted above in the HPI.    Physical Exam     Vital signs: BP (!) 167/83   Pulse 65   Temp 98.1  F (36.7  C) (Oral)   Resp 12   Ht 1.779 m (5' 10.04\")   Wt 92.3 kg (203 lb 8 oz)   SpO2 100%   BMI 29.17 kg/m      ECOG performance status:  2  Vascular access:  L arm PICC    GENERAL: Well-nourished healthy-appearing man, sitting in chair, no acute distress.   HEENT: No icterus, no pallor. Moist mucous membranes.   LUNGS: Clear to ausculation bilaterally, normal work of breathing.   CARDIOVASCULAR: Regular rate and rhythm, no " murmurs, gallops or rubs.   ABDOMEN: Soft, nontender and nondistended.  EXTREMITIES: No cyanosis, no clubbing, no edema.   NEUROLOGIC: No focal deficits, alert/ oriented  LYMPH NODE EXAM: No palpable adenopathy.    Objective Data     Lab data:  I have personally reviewed the lab data, notable for:    - reviewed external studies    Radiology data:  I have personally reviewed the radiology data, notable for:  - reviewed external studies    Pathology and other data:  I have personally reviewed and interpreted the pathology data, notable for:    - reviewed external studies    Medical/Surgical History     Past medical history:  Active Ambulatory Problems     Diagnosis Date Noted     No Active Ambulatory Problems     Resolved Ambulatory Problems     Diagnosis Date Noted     No Resolved Ambulatory Problems     No Additional Past Medical History       Past surgical history:  No past surgical history on file.     Social history:   Social History     Tobacco Use     Smoking status: Every Day     Types: Cigarettes   Substance Use Topics     Alcohol use: Never     Drug use: Never       Family history:  No family history on file.    Allergies:   No Known Allergies    Outpatient medications:   No current outpatient medications on file.     No